# Patient Record
Sex: FEMALE | Race: WHITE | NOT HISPANIC OR LATINO | Employment: OTHER | URBAN - METROPOLITAN AREA
[De-identification: names, ages, dates, MRNs, and addresses within clinical notes are randomized per-mention and may not be internally consistent; named-entity substitution may affect disease eponyms.]

---

## 2017-06-22 ENCOUNTER — ALLSCRIPTS OFFICE VISIT (OUTPATIENT)
Dept: OTHER | Facility: OTHER | Age: 57
End: 2017-06-22

## 2017-06-22 DIAGNOSIS — Z12.31 ENCOUNTER FOR SCREENING MAMMOGRAM FOR MALIGNANT NEOPLASM OF BREAST: ICD-10-CM

## 2017-06-28 ENCOUNTER — GENERIC CONVERSION - ENCOUNTER (OUTPATIENT)
Dept: OTHER | Facility: OTHER | Age: 57
End: 2017-06-28

## 2017-09-01 ENCOUNTER — HOSPITAL ENCOUNTER (OUTPATIENT)
Dept: MAMMOGRAPHY | Facility: MEDICAL CENTER | Age: 57
Discharge: HOME | End: 2017-09-01
Payer: COMMERCIAL

## 2017-09-01 DIAGNOSIS — Z12.31 ENCOUNTER FOR SCREENING MAMMOGRAM FOR MALIGNANT NEOPLASM OF BREAST: ICD-10-CM

## 2017-09-01 PROCEDURE — G0202 SCR MAMMO BI INCL CAD: HCPCS

## 2018-01-11 NOTE — PROGRESS NOTES
Chief Complaint  Per Dr Lyudmila Andrade request I called the Cytology Department at [facility] to question results from the patient's pap smear last year  He then returned my call yesterday and stated that since it is over one year since the pap was done an amended report can not be issued  But , he stated that there were no endocervical cells noted upon his review that would be consistent with the patient having a hysterectomy  He apologized for any confusion that this may have caused  Active Problems    1  Atrophic vaginitis (627 3) (N95 2)   2  Encounter for routine gynecological examination (V72 31) (Z01 419)   3  Encounter for routine gynecological examination (V72 31) (Z01 419)   4  Encounter for screening mammogram for malignant neoplasm of breast (V76 12)   (Z12 31)   5  Visit for screening mammogram (V76 12) (Z12 31)   6  Vulvar irritation (624 8) (N90 89)    Current Meds   1  Alive Once Daily Womens 50+ Oral Tablet Recorded   2  Aspirin 81 MG TABS Recorded   3  Estrace 0 1 MG/GM Vaginal Cream; insert 1 applicatorful intravaginally twice weekly; Therapy: [id number] to (Evaluate:[id number])  Requested for: 98QTW5693; Last   Rx:[id number] Ordered   4  Estrace 0 1 MG/GM Vaginal Cream; INSERT 1 APPLICATORFUL INTRAVAGINALLY   TWICE WEEKLY; Therapy: [id number] to (Brennon Coleman)  Requested for: 48WRP1630; Last   Rx:[id number] Ordered   5  Fenofibrate TABS Recorded   6  Keppra 1000 MG Oral Tablet; TAKE 2 TABLETS TWICE DAILY Recorded   7  LaMICtal TABS; Take 1 tablet twice daily Recorded   8  Levothyroxine Sodium 75 MCG Oral Tablet Recorded   9  Lisinopril 2 5 MG Oral Tablet; Therapy: (Recorded:22Jun2017) to Recorded    Allergies    1   No Known Drug Allergies    Signatures   Electronically signed by : Leata Lennox, DO; Jun 28 2017  9:35AM EST                       (Author)

## 2018-01-13 VITALS
HEIGHT: 65 IN | SYSTOLIC BLOOD PRESSURE: 126 MMHG | BODY MASS INDEX: 24.16 KG/M2 | WEIGHT: 145 LBS | DIASTOLIC BLOOD PRESSURE: 76 MMHG

## 2018-01-13 NOTE — RESULT NOTES
Verified Results  ([facility] [facility]) Pap IG (Image Guided) [id number] 12:00AM William Santoshmarilyn     Test Name Result Flag Reference   DIAGNOSIS: Comment     NEGATIVE FOR INTRAEPITHELIAL LESION AND MALIGNANCY  Specimen adequacy: Comment     Satisfactory for evaluation  Endocervical and/or squamous metaplastic  cells (endocervical component) are present  Clinician provided ICD10: Comment     Z01 419   Performed by: Tryee Jerry, Cytotechnologist (ASCP)     Chavo Durham Note: Comment     The Pap smear is a screening test designed to aid in the detection of  premalignant and malignant conditions of the uterine cervix  It is not a  diagnostic procedure and should not be used as the sole means of detecting  cervical cancer  Both false-positive and false-negative reports do occur

## 2018-07-14 ENCOUNTER — HOSPITAL ENCOUNTER (OUTPATIENT)
Dept: RADIOLOGY | Facility: HOSPITAL | Age: 58
Discharge: HOME | End: 2018-07-14
Attending: INTERNAL MEDICINE
Payer: COMMERCIAL

## 2018-07-14 ENCOUNTER — TRANSCRIBE ORDERS (OUTPATIENT)
Dept: ADMINISTRATIVE | Facility: HOSPITAL | Age: 58
End: 2018-07-14

## 2018-07-14 DIAGNOSIS — R06.02 SHORTNESS OF BREATH: ICD-10-CM

## 2018-07-14 DIAGNOSIS — R05.9 COUGH: ICD-10-CM

## 2018-07-14 DIAGNOSIS — R06.02 SHORTNESS OF BREATH: Primary | ICD-10-CM

## 2018-07-14 PROCEDURE — 71046 X-RAY EXAM CHEST 2 VIEWS: CPT

## 2021-03-19 ENCOUNTER — TELEPHONE (OUTPATIENT)
Dept: OBGYN CLINIC | Facility: MEDICAL CENTER | Age: 61
End: 2021-03-19

## 2021-03-22 ENCOUNTER — TELEPHONE (OUTPATIENT)
Dept: OBGYN CLINIC | Facility: MEDICAL CENTER | Age: 61
End: 2021-03-22

## 2021-03-22 NOTE — TELEPHONE ENCOUNTER
CYNDYM for pt to call office back  We would like to know if she would like to schedule for a yearly exam with us as she hasn't been seen since 2017

## 2021-09-30 ENCOUNTER — NURSE TRIAGE (OUTPATIENT)
Dept: OTHER | Facility: OTHER | Age: 61
End: 2021-09-30

## 2021-09-30 DIAGNOSIS — Z20.822 SUSPECTED SEVERE ACUTE RESPIRATORY SYNDROME CORONAVIRUS 2 (SARS-COV-2) INFECTION: Primary | ICD-10-CM

## 2021-09-30 PROCEDURE — U0005 INFEC AGEN DETEC AMPLI PROBE: HCPCS | Performed by: FAMILY MEDICINE

## 2021-09-30 PROCEDURE — U0003 INFECTIOUS AGENT DETECTION BY NUCLEIC ACID (DNA OR RNA); SEVERE ACUTE RESPIRATORY SYNDROME CORONAVIRUS 2 (SARS-COV-2) (CORONAVIRUS DISEASE [COVID-19]), AMPLIFIED PROBE TECHNIQUE, MAKING USE OF HIGH THROUGHPUT TECHNOLOGIES AS DESCRIBED BY CMS-2020-01-R: HCPCS | Performed by: FAMILY MEDICINE

## 2021-09-30 NOTE — TELEPHONE ENCOUNTER
1  Were you within 6 feet or less, for up to 15 minutes or more with a person that has a confirmed COVID-19 test?  Denied  2  What was the date of your exposure? N/A  3  Are you experiencing any symptoms attributed to the virus?  (Assess for SOB, cough, fever, difficulty breathing)  Started 9/28/2021  Dry, sore throat  Intermittent dry cough  Chills  Stiffness of body  4  HIGH RISK: Do you have any history heart or lung conditions, weakened immune system, diabetes, Asthma, CHF, HIV, COPD, Chemo, renal failure, sickle cell, etc?   Denied  5  PREGNANCY: Are you pregnant or did you recently give birth?   N/A

## 2021-09-30 NOTE — TELEPHONE ENCOUNTER
Regarding: Covid A-Symptomatic Travel  ----- Message from Abel Mayberry sent at 9/30/2021 11:39 AM EDT -----  "I am traveling and I need a Covid test"

## 2021-10-01 ENCOUNTER — TELEPHONE (OUTPATIENT)
Dept: OTHER | Facility: OTHER | Age: 61
End: 2021-10-01

## 2021-10-01 LAB — SARS-COV-2 RNA RESP QL NAA+PROBE: NEGATIVE

## 2021-10-03 ENCOUNTER — NURSE TRIAGE (OUTPATIENT)
Dept: OTHER | Facility: OTHER | Age: 61
End: 2021-10-03

## 2021-10-03 DIAGNOSIS — Z20.828 SARS-ASSOCIATED CORONAVIRUS EXPOSURE: Primary | ICD-10-CM

## 2021-10-04 PROCEDURE — U0003 INFECTIOUS AGENT DETECTION BY NUCLEIC ACID (DNA OR RNA); SEVERE ACUTE RESPIRATORY SYNDROME CORONAVIRUS 2 (SARS-COV-2) (CORONAVIRUS DISEASE [COVID-19]), AMPLIFIED PROBE TECHNIQUE, MAKING USE OF HIGH THROUGHPUT TECHNOLOGIES AS DESCRIBED BY CMS-2020-01-R: HCPCS | Performed by: FAMILY MEDICINE

## 2021-10-04 PROCEDURE — U0005 INFEC AGEN DETEC AMPLI PROBE: HCPCS | Performed by: FAMILY MEDICINE

## 2021-10-05 ENCOUNTER — TELEPHONE (OUTPATIENT)
Dept: OTHER | Facility: OTHER | Age: 61
End: 2021-10-05

## 2021-12-21 ENCOUNTER — OFFICE VISIT (OUTPATIENT)
Dept: AUDIOLOGY | Age: 61
End: 2021-12-21
Payer: COMMERCIAL

## 2021-12-21 DIAGNOSIS — R42 DIZZINESS AND GIDDINESS: Primary | ICD-10-CM

## 2021-12-21 PROCEDURE — 92540 BASIC VESTIBULAR EVALUATION: CPT | Performed by: AUDIOLOGIST

## 2021-12-21 PROCEDURE — 92567 TYMPANOMETRY: CPT | Performed by: AUDIOLOGIST

## 2021-12-21 PROCEDURE — 92537 CALORIC VSTBLR TEST W/REC: CPT | Performed by: AUDIOLOGIST

## 2022-01-03 ENCOUNTER — TELEPHONE (OUTPATIENT)
Dept: GASTROENTEROLOGY | Facility: HOSPITAL | Age: 62
End: 2022-01-03

## 2022-01-04 ENCOUNTER — ANESTHESIA (OUTPATIENT)
Dept: GASTROENTEROLOGY | Facility: HOSPITAL | Age: 62
End: 2022-01-04

## 2022-01-04 ENCOUNTER — HOSPITAL ENCOUNTER (OUTPATIENT)
Dept: GASTROENTEROLOGY | Facility: HOSPITAL | Age: 62
Setting detail: OUTPATIENT SURGERY
Discharge: HOME | End: 2022-01-04
Attending: INTERNAL MEDICINE | Admitting: INTERNAL MEDICINE
Payer: COMMERCIAL

## 2022-01-04 ENCOUNTER — ANESTHESIA EVENT (OUTPATIENT)
Dept: GASTROENTEROLOGY | Facility: HOSPITAL | Age: 62
End: 2022-01-04

## 2022-01-04 VITALS
SYSTOLIC BLOOD PRESSURE: 120 MMHG | HEART RATE: 60 BPM | HEIGHT: 65 IN | OXYGEN SATURATION: 100 % | RESPIRATION RATE: 18 BRPM | BODY MASS INDEX: 25.66 KG/M2 | WEIGHT: 154 LBS | DIASTOLIC BLOOD PRESSURE: 69 MMHG

## 2022-01-04 DIAGNOSIS — Z12.11 COLON CANCER SCREENING: ICD-10-CM

## 2022-01-04 PROCEDURE — 88305 TISSUE EXAM BY PATHOLOGIST: CPT | Performed by: PATHOLOGY

## 2022-01-04 PROCEDURE — 45380 COLONOSCOPY AND BIOPSY: CPT | Performed by: INTERNAL MEDICINE

## 2022-01-04 RX ORDER — LIDOCAINE HYDROCHLORIDE 20 MG/ML
INJECTION, SOLUTION EPIDURAL; INFILTRATION; INTRACAUDAL; PERINEURAL AS NEEDED
Status: DISCONTINUED | OUTPATIENT
Start: 2022-01-04 | End: 2022-01-04

## 2022-01-04 RX ORDER — PROPOFOL 10 MG/ML
INJECTION, EMULSION INTRAVENOUS AS NEEDED
Status: DISCONTINUED | OUTPATIENT
Start: 2022-01-04 | End: 2022-01-04

## 2022-01-04 RX ORDER — SODIUM CHLORIDE 9 MG/ML
125 INJECTION, SOLUTION INTRAVENOUS CONTINUOUS
Status: DISCONTINUED | OUTPATIENT
Start: 2022-01-04 | End: 2022-01-08 | Stop reason: HOSPADM

## 2022-01-04 RX ADMIN — PROPOFOL 30 MG: 10 INJECTION, EMULSION INTRAVENOUS at 13:06

## 2022-01-04 RX ADMIN — SODIUM CHLORIDE 125 ML/HR: 0.9 INJECTION, SOLUTION INTRAVENOUS at 12:45

## 2022-01-04 RX ADMIN — PROPOFOL 30 MG: 10 INJECTION, EMULSION INTRAVENOUS at 13:13

## 2022-01-04 RX ADMIN — PROPOFOL 140 MG: 10 INJECTION, EMULSION INTRAVENOUS at 13:02

## 2022-01-04 RX ADMIN — PROPOFOL 30 MG: 10 INJECTION, EMULSION INTRAVENOUS at 13:09

## 2022-01-04 RX ADMIN — PROPOFOL 30 MG: 10 INJECTION, EMULSION INTRAVENOUS at 13:04

## 2022-01-04 RX ADMIN — PROPOFOL 30 MG: 10 INJECTION, EMULSION INTRAVENOUS at 13:11

## 2022-01-04 RX ADMIN — LIDOCAINE HYDROCHLORIDE 50 MG: 20 INJECTION, SOLUTION EPIDURAL; INFILTRATION; INTRACAUDAL; PERINEURAL at 13:02

## 2022-01-04 NOTE — INTERVAL H&P NOTE
H&P reviewed  After examining the patient I find no changes in the patients condition since the H&P had been written      Vitals:    01/04/22 1229   BP: (!) 178/86   Pulse: 63   Resp: 16   SpO2: 100%

## 2022-01-04 NOTE — ANESTHESIA PREPROCEDURE EVALUATION
Procedure:  COLONOSCOPY    Relevant Problems   CARDIO   (+) Hypertension      ENDO   (+) Hypothyroid      NEURO/PSYCH   (+) Seizure disorder (HCC)      Other   (+) Colon polyp   (+) Sarcoid        Physical Exam    Airway    Mallampati score: II  TM Distance: >3 FB  Neck ROM: full     Dental   No notable dental hx     Cardiovascular  Rhythm: regular, Rate: normal, Cardiovascular exam normal    Pulmonary  Pulmonary exam normal Breath sounds clear to auscultation,     Other Findings        Anesthesia Plan  ASA Score- 2     Anesthesia Type- general and IV sedation with anesthesia with ASA Monitors  Additional Monitors:   Airway Plan:           Plan Factors-    Chart reviewed  Patient summary reviewed  Patient is not a current smoker  Patient instructed to abstain from smoking on day of procedure  Patient did not smoke on day of surgery  Induction- intravenous  Postoperative Plan-     Informed Consent- Anesthetic plan and risks discussed with patient  I personally reviewed this patient with the CRNA  Discussed and agreed on the Anesthesia Plan with the CRNA  Regina Tobar

## 2024-12-16 ENCOUNTER — TELEPHONE (OUTPATIENT)
Age: 64
End: 2024-12-16

## 2024-12-18 ENCOUNTER — OFFICE VISIT (OUTPATIENT)
Dept: GASTROENTEROLOGY | Facility: MEDICAL CENTER | Age: 64
End: 2024-12-18
Payer: COMMERCIAL

## 2024-12-18 VITALS
TEMPERATURE: 98.4 F | OXYGEN SATURATION: 98 % | HEART RATE: 89 BPM | HEIGHT: 65 IN | BODY MASS INDEX: 28.32 KG/M2 | DIASTOLIC BLOOD PRESSURE: 84 MMHG | SYSTOLIC BLOOD PRESSURE: 126 MMHG | WEIGHT: 170 LBS

## 2024-12-18 DIAGNOSIS — K51.00 ULCERATIVE (CHRONIC) PANCOLITIS WITHOUT COMPLICATIONS (HCC): ICD-10-CM

## 2024-12-18 DIAGNOSIS — K92.1 BLACK STOOLS: Primary | ICD-10-CM

## 2024-12-18 PROCEDURE — 99214 OFFICE O/P EST MOD 30 MIN: CPT | Performed by: NURSE PRACTITIONER

## 2024-12-18 RX ORDER — SODIUM CHLORIDE, SODIUM LACTATE, POTASSIUM CHLORIDE, CALCIUM CHLORIDE 600; 310; 30; 20 MG/100ML; MG/100ML; MG/100ML; MG/100ML
125 INJECTION, SOLUTION INTRAVENOUS CONTINUOUS
OUTPATIENT
Start: 2024-12-18

## 2024-12-18 RX ORDER — POLYETHYLENE GLYCOL 3350 17 G/17G
238 POWDER, FOR SOLUTION ORAL ONCE
Qty: 238 G | Refills: 0 | Status: SHIPPED | OUTPATIENT
Start: 2024-12-18 | End: 2024-12-18

## 2024-12-18 NOTE — PROGRESS NOTES
Name: Kathy Santillan      : 1960      MRN: [id number]  Encounter Provider: RJ Bear  Encounter Date: 2024   Encounter department: [facility] [place]  :  Assessment & Plan  Black stools  Intermittent black stools over the past several months with occasional bright red blood on wipe.  Last colonoscopy was  which noted melanosis coli.  Biopsies negative for microscopic/collagenous colitis.  Change in bowel habits over the past several months.  No Pepto-Bismol use.  Stools are softer 2-3 times per day.  BMs were formed daily before this.  No new medications, sick contacts, antibiotic use.  No weight loss, abdominal pain, heartburn or reflux.  Recent CBC and occult blood stool testing was negative.  Will rule out PUD, gastritis, esophagitis, H. pylori, celiac, BD, colon polyps and neoplasm    Orders:  •  H. pylori antigen, stool; Future  •  Calprotectin,Fecal; Future  •  Colonoscopy; Future  •  EGD; Future  •  polyethylene glycol (MiraLax) 17 GM/SCOOP powder; Take 238 g by mouth once for 1 dose Take 238 g my mouth. Use as directed  •  Clostridium difficile toxin by PCR with EIA; Future  •  IgA; Future  •  Celiac Ab tTG DGP TIgA w/Rflx; Future  -Follow-up in office after testing    I reviewed with patient/family potential risks of endoscopic evaluation including possible infection, bleeding or perforation.  Patient/family verbalized understanding of potential risks and agreed to procedure(s).      History of Present Illness   HPI  Kathy Santillan is a 64 y.o. female who presents with black stools and change in bowel habits.  She has a past medical history of HTN, personal history of colon polyps, hypothyroidism, sarcoid and seizure disorder.    Started with intermittent bouts of black stools and bright red blood on wipe over the past several months.  Cannot pinpoint triggers.  No heartburn or reflux.  No abdominal pain or weight loss.  No fever or chills.   BMs are softer at times and her 2-3 times per day.  This is a change in her bowel habits as they were formed and brown daily.  Was on iron supplementation but black stool started before iron was started.  Questionable vague history of an ulcerative colitis in the past but last EGD 1/22 noted pancolonic melanosis, abnormal mucosa in the sigmoid colon.  Biopsies noted benign colonic mucosa with intact glandular architecture and melanosis coli.  Negative for lymphocytic/collagenous colitis.  No new medications or antibiotic use.  No sick contacts.  Recent CBC was normal.  Occult blood testing was negative.    Reporting some phlegm in her chest that is difficult to clear at times.  Did see ENT recently.  And nasal spray was ordered.      No recent abdominal imaging to review.  Labs 12/24-CMP normal, CBC normal, hemoglobin A1c 5.9, TSH 3.5, occult blood testing 10/24 was negative.    Prior EGD/colonoscopy    Colonoscopy 1/22-pancolonic melanosis, abnormal mucosa in the sigmoid colon.  Repeat colonoscopy recommended in 10 years.  Biopsies noted benign colonic mucosa with intact glandular architecture and melanosis coli.  Negative for lymphocytic/collagenous colitis.        Review of Systems   Gastrointestinal:  Positive for anal bleeding and diarrhea.   All other systems reviewed and are negative.    Medical History Reviewed by provider this encounter:  Tobacco  Allergies  Meds  Problems  Med Hx  Surg Hx  Fam Hx     .  Current Outpatient Medications on File Prior to Visit   Medication Sig Dispense Refill   • Ascorbic Acid, Vitamin C, (VITAMIN C) 100 MG tablet Take 100 mg by mouth every other day      • azelastine (ASTELIN) 0.1 % nasal spray 1 spray into each nostril 2 (two) times a day 30 mL 11   • levothyroxine 50 mcg tablet Take 50 mcg by mouth every other day Alternating with 75mcg     • levothyroxine 75 mcg tablet Take 75 mcg by mouth every other day Alternating with 50mcg     • lisinopril (ZESTRIL) 10 mg  "tablet Take 20 mg by mouth daily     • atorvastatin (LIPITOR) 10 mg tablet Take 10 mg by mouth daily     • Cholecalciferol (VITAMIN D3 PO) Take by mouth     • clonazePAM (KlonoPIN) 1 mg tablet Take 1 tablet by mouth daily as needed (Patient not taking: Reported on 2022)     • Cyanocobalamin (VITAMIN B-12 PO) Take by mouth     • estradiol (ESTRACE) 0.1 mg/g vaginal cream Insert 1 Applicatorful into the vagina 2 (two) times a week (Patient not taking: Reported on 10/27/2021)     • fenofibrate 160 MG tablet Take by mouth (Patient not taking: Reported on 10/27/2021)     • Flaxseed, Linseed, (FLAX SEEDS PO) Take by mouth (Patient not taking: Reported on 2022)     • lamoTRIgine (LaMICtal) 200 MG tablet Take 200 mg by mouth 2 (two) times a day     • levETIRAcetam (KEPPRA) 1000 MG tablet Take 1 tablet by mouth 2 (two) times a day     • Sodium Caprylate POWD Take 500 mg by mouth     • Vimpat 100 MG tablet Take 100 mg by mouth 2 (two) times a day (Patient not taking: Reported on 2022)       No current facility-administered medications on file prior to visit.      Social History     Tobacco Use   • Smoking status: Former     Current packs/day: 0.00     Types: Cigarettes     Quit date:      Years since quittin.9   • Smokeless tobacco: Never   Vaping Use   • Vaping status: Never Used   Substance and Sexual Activity   • Alcohol use: Yes     Comment: Occas   • Drug use: Never   • Sexual activity: Not on file        Objective   /84 (Patient Position: Sitting, Cuff Size: Large)   Pulse 89   Temp 98.4 °F (36.9 °C) (Tympanic)   Ht 5' 5\" (1.651 m)   Wt 77.1 kg (170 lb)   SpO2 98%   BMI 28.29 kg/m²      Physical Exam  Constitutional:       Appearance: Normal appearance.   HENT:      Head: Normocephalic.   Cardiovascular:      Rate and Rhythm: Normal rate and regular rhythm.      Heart sounds: Normal heart sounds.   Pulmonary:      Breath sounds: Normal breath sounds.   Abdominal:      Palpations: " Abdomen is soft.   Neurological:      Mental Status: She is alert and oriented to person, place, and time.

## 2024-12-18 NOTE — H&P (VIEW-ONLY)
Name: Kathy Santillan      : 1960      MRN: [id number]  Encounter Provider: RJ Bear  Encounter Date: 2024   Encounter department: [facility] [place]  :  Assessment & Plan  Black stools  Intermittent black stools over the past several months with occasional bright red blood on wipe.  Last colonoscopy was  which noted melanosis coli.  Biopsies negative for microscopic/collagenous colitis.  Change in bowel habits over the past several months.  No Pepto-Bismol use.  Stools are softer 2-3 times per day.  BMs were formed daily before this.  No new medications, sick contacts, antibiotic use.  No weight loss, abdominal pain, heartburn or reflux.  Recent CBC and occult blood stool testing was negative.  Will rule out PUD, gastritis, esophagitis, H. pylori, celiac, BD, colon polyps and neoplasm    Orders:  •  H. pylori antigen, stool; Future  •  Calprotectin,Fecal; Future  •  Colonoscopy; Future  •  EGD; Future  •  polyethylene glycol (MiraLax) 17 GM/SCOOP powder; Take 238 g by mouth once for 1 dose Take 238 g my mouth. Use as directed  •  Clostridium difficile toxin by PCR with EIA; Future  •  IgA; Future  •  Celiac Ab tTG DGP TIgA w/Rflx; Future  -Follow-up in office after testing    I reviewed with patient/family potential risks of endoscopic evaluation including possible infection, bleeding or perforation.  Patient/family verbalized understanding of potential risks and agreed to procedure(s).      History of Present Illness   HPI  Kathy Santillan is a 64 y.o. female who presents with black stools and change in bowel habits.  She has a past medical history of HTN, personal history of colon polyps, hypothyroidism, sarcoid and seizure disorder.    Started with intermittent bouts of black stools and bright red blood on wipe over the past several months.  Cannot pinpoint triggers.  No heartburn or reflux.  No abdominal pain or weight loss.  No fever or chills.   BMs are softer at times and her 2-3 times per day.  This is a change in her bowel habits as they were formed and brown daily.  Was on iron supplementation but black stool started before iron was started.  Questionable vague history of an ulcerative colitis in the past but last EGD 1/22 noted pancolonic melanosis, abnormal mucosa in the sigmoid colon.  Biopsies noted benign colonic mucosa with intact glandular architecture and melanosis coli.  Negative for lymphocytic/collagenous colitis.  No new medications or antibiotic use.  No sick contacts.  Recent CBC was normal.  Occult blood testing was negative.    Reporting some phlegm in her chest that is difficult to clear at times.  Did see ENT recently.  And nasal spray was ordered.      No recent abdominal imaging to review.  Labs 12/24-CMP normal, CBC normal, hemoglobin A1c 5.9, TSH 3.5, occult blood testing 10/24 was negative.    Prior EGD/colonoscopy    Colonoscopy 1/22-pancolonic melanosis, abnormal mucosa in the sigmoid colon.  Repeat colonoscopy recommended in 10 years.  Biopsies noted benign colonic mucosa with intact glandular architecture and melanosis coli.  Negative for lymphocytic/collagenous colitis.        Review of Systems   Gastrointestinal:  Positive for anal bleeding and diarrhea.   All other systems reviewed and are negative.    Medical History Reviewed by provider this encounter:  Tobacco  Allergies  Meds  Problems  Med Hx  Surg Hx  Fam Hx     .  Current Outpatient Medications on File Prior to Visit   Medication Sig Dispense Refill   • Ascorbic Acid, Vitamin C, (VITAMIN C) 100 MG tablet Take 100 mg by mouth every other day      • azelastine (ASTELIN) 0.1 % nasal spray 1 spray into each nostril 2 (two) times a day 30 mL 11   • levothyroxine 50 mcg tablet Take 50 mcg by mouth every other day Alternating with 75mcg     • levothyroxine 75 mcg tablet Take 75 mcg by mouth every other day Alternating with 50mcg     • lisinopril (ZESTRIL) 10 mg  "tablet Take 20 mg by mouth daily     • atorvastatin (LIPITOR) 10 mg tablet Take 10 mg by mouth daily     • Cholecalciferol (VITAMIN D3 PO) Take by mouth     • clonazePAM (KlonoPIN) 1 mg tablet Take 1 tablet by mouth daily as needed (Patient not taking: Reported on 2022)     • Cyanocobalamin (VITAMIN B-12 PO) Take by mouth     • estradiol (ESTRACE) 0.1 mg/g vaginal cream Insert 1 Applicatorful into the vagina 2 (two) times a week (Patient not taking: Reported on 10/27/2021)     • fenofibrate 160 MG tablet Take by mouth (Patient not taking: Reported on 10/27/2021)     • Flaxseed, Linseed, (FLAX SEEDS PO) Take by mouth (Patient not taking: Reported on 2022)     • lamoTRIgine (LaMICtal) 200 MG tablet Take 200 mg by mouth 2 (two) times a day     • levETIRAcetam (KEPPRA) 1000 MG tablet Take 1 tablet by mouth 2 (two) times a day     • Sodium Caprylate POWD Take 500 mg by mouth     • Vimpat 100 MG tablet Take 100 mg by mouth 2 (two) times a day (Patient not taking: Reported on 2022)       No current facility-administered medications on file prior to visit.      Social History     Tobacco Use   • Smoking status: Former     Current packs/day: 0.00     Types: Cigarettes     Quit date:      Years since quittin.9   • Smokeless tobacco: Never   Vaping Use   • Vaping status: Never Used   Substance and Sexual Activity   • Alcohol use: Yes     Comment: Occas   • Drug use: Never   • Sexual activity: Not on file        Objective   /84 (Patient Position: Sitting, Cuff Size: Large)   Pulse 89   Temp 98.4 °F (36.9 °C) (Tympanic)   Ht 5' 5\" (1.651 m)   Wt 77.1 kg (170 lb)   SpO2 98%   BMI 28.29 kg/m²      Physical Exam  Constitutional:       Appearance: Normal appearance.   HENT:      Head: Normocephalic.   Cardiovascular:      Rate and Rhythm: Normal rate and regular rhythm.      Heart sounds: Normal heart sounds.   Pulmonary:      Breath sounds: Normal breath sounds.   Abdominal:      Palpations: " Abdomen is soft.   Neurological:      Mental Status: She is alert and oriented to person, place, and time.

## 2024-12-20 ENCOUNTER — ANESTHESIA (OUTPATIENT)
Dept: ANESTHESIOLOGY | Facility: HOSPITAL | Age: 64
End: 2024-12-20

## 2024-12-20 ENCOUNTER — ANESTHESIA EVENT (OUTPATIENT)
Dept: ANESTHESIOLOGY | Facility: HOSPITAL | Age: 64
End: 2024-12-20

## 2024-12-23 ENCOUNTER — TELEPHONE (OUTPATIENT)
Age: 64
End: 2024-12-23

## 2024-12-23 NOTE — TELEPHONE ENCOUNTER
Patients GI provider:  Dr. Frank    Number to return call: [phone]    Reason for call: Pt calling to speak with Dr. Frank about taking anti-seizure medicine. Pt takes her medication at 9 am and worried about her procedure time. Pt would like a call back to see about an AM or PM procedure time.     Scheduled procedure/appointment date if applicable: procedure 1/3/25

## 2024-12-23 NOTE — TELEPHONE ENCOUNTER
Spoke to pt - she understood - okay to take with small sips of water at least 2 hours prior. She will take it at 8am and I put a note in for the procedure to be no earlier than 10am - pt agreeable

## 2025-01-03 ENCOUNTER — ANESTHESIA (OUTPATIENT)
Dept: GASTROENTEROLOGY | Facility: MEDICAL CENTER | Age: 65
End: 2025-01-03
Payer: COMMERCIAL

## 2025-01-03 ENCOUNTER — ANESTHESIA EVENT (OUTPATIENT)
Dept: GASTROENTEROLOGY | Facility: MEDICAL CENTER | Age: 65
End: 2025-01-03
Payer: COMMERCIAL

## 2025-01-03 ENCOUNTER — HOSPITAL ENCOUNTER (OUTPATIENT)
Dept: GASTROENTEROLOGY | Facility: MEDICAL CENTER | Age: 65
Setting detail: OUTPATIENT SURGERY
End: 2025-01-03
Payer: COMMERCIAL

## 2025-01-03 VITALS
WEIGHT: 166 LBS | BODY MASS INDEX: 27.66 KG/M2 | HEIGHT: 65 IN | HEART RATE: 63 BPM | SYSTOLIC BLOOD PRESSURE: 112 MMHG | DIASTOLIC BLOOD PRESSURE: 75 MMHG | RESPIRATION RATE: 20 BRPM | OXYGEN SATURATION: 99 % | TEMPERATURE: 96.9 F

## 2025-01-03 DIAGNOSIS — K92.1 BLACK STOOLS: ICD-10-CM

## 2025-01-03 DIAGNOSIS — K21.00 GASTROESOPHAGEAL REFLUX DISEASE WITH ESOPHAGITIS WITHOUT HEMORRHAGE: Primary | ICD-10-CM

## 2025-01-03 PROCEDURE — 45385 COLONOSCOPY W/LESION REMOVAL: CPT | Performed by: INTERNAL MEDICINE

## 2025-01-03 PROCEDURE — 43239 EGD BIOPSY SINGLE/MULTIPLE: CPT | Performed by: INTERNAL MEDICINE

## 2025-01-03 PROCEDURE — 88305 TISSUE EXAM BY PATHOLOGIST: CPT | Performed by: PATHOLOGY

## 2025-01-03 RX ORDER — PROPOFOL 10 MG/ML
INJECTION, EMULSION INTRAVENOUS AS NEEDED
Status: DISCONTINUED | OUTPATIENT
Start: 2025-01-03 | End: 2025-01-03

## 2025-01-03 RX ORDER — SODIUM CHLORIDE 9 MG/ML
125 INJECTION, SOLUTION INTRAVENOUS CONTINUOUS
Status: DISCONTINUED | OUTPATIENT
Start: 2025-01-03 | End: 2025-01-07 | Stop reason: HOSPADM

## 2025-01-03 RX ORDER — SODIUM CHLORIDE, SODIUM LACTATE, POTASSIUM CHLORIDE, CALCIUM CHLORIDE 600; 310; 30; 20 MG/100ML; MG/100ML; MG/100ML; MG/100ML
125 INJECTION, SOLUTION INTRAVENOUS CONTINUOUS
Status: DISCONTINUED | OUTPATIENT
Start: 2025-01-03 | End: 2025-01-07 | Stop reason: HOSPADM

## 2025-01-03 RX ORDER — PROPOFOL 10 MG/ML
INJECTION, EMULSION INTRAVENOUS CONTINUOUS PRN
Status: DISCONTINUED | OUTPATIENT
Start: 2025-01-03 | End: 2025-01-03

## 2025-01-03 RX ORDER — SIMETHICONE 40MG/0.6ML
SUSPENSION, DROPS(FINAL DOSAGE FORM)(ML) ORAL AS NEEDED
Status: COMPLETED | OUTPATIENT
Start: 2025-01-03 | End: 2025-01-03

## 2025-01-03 RX ADMIN — PROPOFOL 50 MG: 10 INJECTION, EMULSION INTRAVENOUS at 12:05

## 2025-01-03 RX ADMIN — PROPOFOL 50 MG: 10 INJECTION, EMULSION INTRAVENOUS at 12:02

## 2025-01-03 RX ADMIN — SODIUM CHLORIDE 125 ML/HR: 0.9 INJECTION, SOLUTION INTRAVENOUS at 11:31

## 2025-01-03 RX ADMIN — Medication 40 MG: at 12:18

## 2025-01-03 RX ADMIN — PROPOFOL 150 MG: 10 INJECTION, EMULSION INTRAVENOUS at 11:57

## 2025-01-03 RX ADMIN — PROPOFOL 120 MCG/KG/MIN: 10 INJECTION, EMULSION INTRAVENOUS at 12:04

## 2025-01-03 RX ADMIN — PROPOFOL 50 MG: 10 INJECTION, EMULSION INTRAVENOUS at 12:00

## 2025-01-03 NOTE — ANESTHESIA PREPROCEDURE EVALUATION
"Procedure:  COLONOSCOPY  EGD    Relevant Problems   ANESTHESIA (within normal limits)      CARDIO   (+) Hypertension      ENDO   (+) Hypothyroid      GI/HEPATIC (within normal limits)      /RENAL (within normal limits)      HEMATOLOGY (within normal limits)      NEURO/PSYCH   (+) Seizure disorder (HCC)      PULMONARY (within normal limits)      FEN/Gastrointestinal   (+) Colon polyp   (+) Ulcerative (chronic) pancolitis without complications (HCC)      Other   (+) Sarcoid        No results found for: \"WBC\", \"HGB\", \"HCT\", \"MCV\", \"PLT\"  Lab Results   Component Value Date    SODIUM 137 12/17/2024    K 4.9 12/17/2024     12/17/2024    CO2 24 12/17/2024    BUN 15 12/17/2024    CREATININE 1.09 12/17/2024    GLUC 95 12/17/2024    CALCIUM 10.3 12/17/2024     Lab Results   Component Value Date    INR 1.0 08/14/2021    INR 1.0 08/13/2021    INR 1.0 08/05/2021     Lab Results   Component Value Date    HGBA1C 5.9 (H) 12/17/2024          Physical Exam    Airway    Mallampati score: II  TM Distance: >3 FB  Neck ROM: full     Dental   No notable dental hx     Cardiovascular  Cardiovascular exam normal    Pulmonary  Pulmonary exam normal     Other Findings  post-pubertal.      Anesthesia Plan  ASA Score- 3     Anesthesia Type- IV sedation with anesthesia with ASA Monitors.         Additional Monitors:     Airway Plan:            Plan Factors-Exercise tolerance (METS): >4 METS.    Chart reviewed. EKG reviewed.  Existing labs reviewed. Patient summary reviewed.                  Induction- intravenous.    Postoperative Plan-         Informed Consent- Anesthetic plan and risks discussed with patient.  I personally reviewed this patient with the CRNA. Discussed and agreed on the Anesthesia Plan with the CRNA..        "

## 2025-01-03 NOTE — ANESTHESIA POSTPROCEDURE EVALUATION
Post-Op Assessment Note    CV Status:  Stable    Pain management: adequate       Mental Status:  Alert, awake and sleepy   Hydration Status:  Euvolemic   PONV Controlled:  Controlled   Airway Patency:  Patent     Post Op Vitals Reviewed: Yes    No anethesia notable event occurred.    Staff: Anesthesiologist           Last Filed PACU Vitals:  Vitals Value Taken Time   Temp     Pulse 80    /62    Resp 14    SpO2 99

## 2025-01-03 NOTE — INTERVAL H&P NOTE
H&P reviewed. After examining the patient I find no changes in the patients condition since the H&P had been written.    Vitals:    01/03/25 1131   BP: 147/76   Pulse: 71   Resp: 16   Temp: (!) 96.9 °F (36.1 °C)   SpO2: 99%

## 2025-01-06 PROCEDURE — 88305 TISSUE EXAM BY PATHOLOGIST: CPT | Performed by: PATHOLOGY
